# Patient Record
Sex: FEMALE | Race: WHITE | NOT HISPANIC OR LATINO | Employment: STUDENT | URBAN - METROPOLITAN AREA
[De-identification: names, ages, dates, MRNs, and addresses within clinical notes are randomized per-mention and may not be internally consistent; named-entity substitution may affect disease eponyms.]

---
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
## 2025-02-07 ENCOUNTER — OFFICE VISIT (OUTPATIENT)
Dept: FAMILY MEDICINE CLINIC | Facility: CLINIC | Age: 20
End: 2025-02-07
Payer: MEDICARE

## 2025-02-07 VITALS
OXYGEN SATURATION: 97 % | DIASTOLIC BLOOD PRESSURE: 66 MMHG | HEIGHT: 64 IN | HEART RATE: 83 BPM | TEMPERATURE: 97.3 F | BODY MASS INDEX: 39.27 KG/M2 | WEIGHT: 230 LBS | SYSTOLIC BLOOD PRESSURE: 108 MMHG | RESPIRATION RATE: 16 BRPM

## 2025-02-07 DIAGNOSIS — E78.5 HYPERLIPIDEMIA, UNSPECIFIED HYPERLIPIDEMIA TYPE: ICD-10-CM

## 2025-02-07 DIAGNOSIS — R73.9 HYPERGLYCEMIA: ICD-10-CM

## 2025-02-07 DIAGNOSIS — D50.9 IRON DEFICIENCY ANEMIA, UNSPECIFIED IRON DEFICIENCY ANEMIA TYPE: Primary | ICD-10-CM

## 2025-02-07 DIAGNOSIS — Z00.00 HEALTHCARE MAINTENANCE: ICD-10-CM

## 2025-02-07 DIAGNOSIS — Z11.1 SCREENING-PULMONARY TB: ICD-10-CM

## 2025-02-07 PROCEDURE — 99214 OFFICE O/P EST MOD 30 MIN: CPT | Performed by: NURSE PRACTITIONER

## 2025-02-07 PROCEDURE — 99395 PREV VISIT EST AGE 18-39: CPT | Performed by: NURSE PRACTITIONER

## 2025-02-07 NOTE — ASSESSMENT & PLAN NOTE
- No longer taking iron.   - Will obtain updated CBC and iron panel.   Orders:  •  CBC and differential; Future  •  Iron Panel (Includes Ferritin, Iron Sat%, Iron, and TIBC); Future

## 2025-02-07 NOTE — ASSESSMENT & PLAN NOTE
Component      Latest Ref Rng 12/9/2023   Cholesterol      See Comment mg/dL 222 (H)    Triglycerides      See Comment mg/dL 92    HDL      >=50 mg/dL 72    LDL Calculated      0 - 100 mg/dL 132 (H)      - Elevated LDL but HDL is good.   - Encourage healthy diet and regular exercise.  - Will continue to monitor fasting lipid panel.   Orders:  •  Lipid Panel with Direct LDL reflex; Future

## 2025-02-07 NOTE — ASSESSMENT & PLAN NOTE
- Encourage low carb diet and regular exercise.  - Will continue to monitor fasting glucose and hemoglobin A1c.   Orders:  •  Comprehensive metabolic panel; Future  •  Hemoglobin A1C; Future

## 2025-02-07 NOTE — ASSESSMENT & PLAN NOTE
- Reviewed immunizations and screenings.   - Discussed regular dental, vision, and GYN exams.   - Routine labs ordered.   Orders:  •  CBC and differential; Future  •  Comprehensive metabolic panel; Future  •  Lipid Panel with Direct LDL reflex; Future  •  TSH, 3rd generation with Free T4 reflex; Future

## 2025-02-07 NOTE — PROGRESS NOTES
Name: Rosa Maria Alvarado      : 2005      MRN: [id number]  Encounter Provider: SHIN Longoria  Encounter Date: 2025   Encounter department: [facility]    Assessment & Plan  Iron deficiency anemia, unspecified iron deficiency anemia type  - No longer taking iron.   - Will obtain updated CBC and iron panel.   Orders:  •  CBC and differential; Future  •  Iron Panel (Includes Ferritin, Iron Sat%, Iron, and TIBC); Future    Hyperlipidemia, unspecified hyperlipidemia type  Component      Latest Ref Rng 2023   Cholesterol      See Comment mg/dL 222 (H)    Triglycerides      See Comment mg/dL 92    HDL      >=50 mg/dL 72    LDL Calculated      0 - 100 mg/dL 132 (H)      - Elevated LDL but HDL is good.   - Encourage healthy diet and regular exercise.  - Will continue to monitor fasting lipid panel.   Orders:  •  Lipid Panel with Direct LDL reflex; Future    Hyperglycemia  - Encourage low carb diet and regular exercise.  - Will continue to monitor fasting glucose and hemoglobin A1c.   Orders:  •  Comprehensive metabolic panel; Future  •  Hemoglobin A1C; Future    Healthcare maintenance  - Reviewed immunizations and screenings.   - Discussed regular dental, vision, and GYN exams.   - Routine labs ordered.   Orders:  •  CBC and differential; Future  •  Comprehensive metabolic panel; Future  •  Lipid Panel with Direct LDL reflex; Future  •  TSH, 3rd generation with Free T4 reflex; Future    Screening-pulmonary TB    Orders:  •  Quantiferon TB Gold Plus Assay; Future         History of Present Illness     Patient with PMH of HLD, hyperglycemia, and iron deficiency anemia presents to office today for physical. Needs TB screening for her job. She is due for updated blood work. Denies any concerns or complaints today.           Review of Systems   Constitutional:  Negative for fatigue and fever.   HENT:  Negative for congestion, postnasal drip and trouble swallowing.    Eyes:   Negative for visual disturbance.   Respiratory:  Negative for cough and shortness of breath.    Cardiovascular:  Negative for chest pain and palpitations.   Gastrointestinal:  Negative for abdominal pain and blood in stool.   Endocrine: Negative for cold intolerance and heat intolerance.   Genitourinary:  Negative for difficulty urinating, dysuria and frequency.   Musculoskeletal:  Negative for gait problem.   Skin:  Negative for rash.   Neurological:  Negative for dizziness, syncope and headaches.   Hematological:  Negative for adenopathy.   Psychiatric/Behavioral:  Negative for behavioral problems.      History reviewed. No pertinent past medical history.  History reviewed. No pertinent surgical history.  Family History   Adopted: Yes     Social History     Tobacco Use   • Smoking status: Never     Passive exposure: Never   • Smokeless tobacco: Never   Substance and Sexual Activity   • Alcohol use: Not on file   • Drug use: Not on file   • Sexual activity: Not on file     Current Outpatient Medications on File Prior to Visit   Medication Sig   • fluticasone (FLONASE) 50 mcg/act nasal spray 2 sprays into each nostril daily   • naproxen (NAPROSYN) 500 mg tablet Take 500 mg by mouth 2 (two) times a day with meals   • Aviva 3-0.03 MG per tablet Take 1 tablet by mouth daily   • [DISCONTINUED] ferrous sulfate 324 (65 Fe) mg Take 1 tablet (324 mg total) by mouth daily before breakfast     No Known Allergies  Immunization History   Administered Date(s) Administered   • COVID-19 Moderna mRNA Vaccine 12 Yr+ 50 mcg/0.5 mL (Spikevax) 10/07/2023   • DTP 2005, 2005, 02/13/2006, 04/05/2007, 12/17/2010   • DTaP 2005, 2005, 02/13/2006, 04/05/2007   • DTaP / IPV 12/17/2010   • HPV Quadrivalent 09/11/2014, 11/18/2014, 03/20/2015   • Hep A, ped/adol, 2 dose 12/17/2010, 04/10/2012   • Hep B / HiB 2005, 2005, 12/18/2006   • Hep B, Adolescent or Pediatric 2005, 2005, 12/18/2006   •  "Hepatitis A 12/17/2010, 04/10/2012   • HiB 2005, 2005, 12/18/2006   • Hib (PRP-T) 2005, 2005, 12/18/2006   • INFLUENZA 11/16/2007, 12/17/2010, 10/10/2011, 11/18/2014, 11/03/2017, 12/04/2018   • IPV 2005, 2005, 04/05/2007, 12/17/2010   • Influenza, injectable, quadrivalent, preservative free 0.5 mL 12/06/2023   • MMR 12/18/2006, 12/17/2010   • Meningococcal MCV4, Unspecified 11/03/2017, 08/20/2021   • Meningococcal MCV4P 11/03/2017, 08/20/2021   • Pneumococcal Conjugate 13-Valent 2005, 2005, 02/13/2006, 04/05/2007   • Pneumococcal Conjugate PCV 7 2005, 2005, 02/13/2006, 04/05/2007   • Tdap 11/03/2017   • Varicella 08/11/2006, 12/17/2010     Objective   /66 (BP Location: Left arm, Patient Position: Sitting, Cuff Size: Standard)   Pulse 83   Temp (!) 97.3 °F (36.3 °C) (Tympanic)   Resp 16   Ht 5' 4\" (1.626 m)   Wt 104 kg (230 lb)   SpO2 97%   BMI 39.48 kg/m²     Physical Exam  Vitals and nursing note reviewed.   Constitutional:       General: She is not in acute distress.     Appearance: Normal appearance. She is well-developed. She is not ill-appearing.   HENT:      Head: Normocephalic and atraumatic.      Right Ear: Tympanic membrane, ear canal and external ear normal.      Left Ear: Tympanic membrane, ear canal and external ear normal.      Nose: Nose normal.      Mouth/Throat:      Mouth: Mucous membranes are moist.      Pharynx: No posterior oropharyngeal erythema.   Eyes:      Conjunctiva/sclera: Conjunctivae normal.      Pupils: Pupils are equal, round, and reactive to light.   Cardiovascular:      Rate and Rhythm: Normal rate and regular rhythm.      Heart sounds: Normal heart sounds.   Pulmonary:      Effort: Pulmonary effort is normal.      Breath sounds: Normal breath sounds.   Abdominal:      General: Bowel sounds are normal.      Palpations: Abdomen is soft.   Musculoskeletal:         General: Normal range of motion.      Cervical " back: Normal range of motion.   Lymphadenopathy:      Cervical: No cervical adenopathy.   Skin:     General: Skin is warm and dry.   Neurological:      Mental Status: She is alert and oriented to person, place, and time.   Psychiatric:         Mood and Affect: Mood normal.         Behavior: Behavior normal.

## 2025-02-14 ENCOUNTER — APPOINTMENT (OUTPATIENT)
Dept: LAB | Facility: CLINIC | Age: 20
End: 2025-02-14
Payer: MEDICARE

## 2025-02-14 DIAGNOSIS — Z11.1 SCREENING-PULMONARY TB: ICD-10-CM

## 2025-02-14 DIAGNOSIS — Z00.00 HEALTHCARE MAINTENANCE: ICD-10-CM

## 2025-02-14 DIAGNOSIS — R73.9 HYPERGLYCEMIA: ICD-10-CM

## 2025-02-14 DIAGNOSIS — D50.9 IRON DEFICIENCY ANEMIA, UNSPECIFIED IRON DEFICIENCY ANEMIA TYPE: ICD-10-CM

## 2025-02-14 DIAGNOSIS — E78.5 HYPERLIPIDEMIA, UNSPECIFIED HYPERLIPIDEMIA TYPE: ICD-10-CM

## 2025-02-14 LAB
ALBUMIN SERPL BCG-MCNC: 4.5 G/DL (ref 3.5–5)
ALP SERPL-CCNC: 89 U/L (ref 34–104)
ALT SERPL W P-5'-P-CCNC: 22 U/L (ref 7–52)
ANION GAP SERPL CALCULATED.3IONS-SCNC: 10 MMOL/L (ref 4–13)
AST SERPL W P-5'-P-CCNC: 23 U/L (ref 13–39)
BASOPHILS # BLD AUTO: 0.02 THOUSANDS/ÂΜL (ref 0–0.1)
BASOPHILS NFR BLD AUTO: 0 % (ref 0–1)
BILIRUB SERPL-MCNC: 0.54 MG/DL (ref 0.2–1)
BUN SERPL-MCNC: 14 MG/DL (ref 5–25)
CALCIUM SERPL-MCNC: 9.6 MG/DL (ref 8.4–10.2)
CHLORIDE SERPL-SCNC: 102 MMOL/L (ref 96–108)
CHOLEST SERPL-MCNC: 174 MG/DL (ref ?–200)
CO2 SERPL-SCNC: 27 MMOL/L (ref 21–32)
CREAT SERPL-MCNC: 0.55 MG/DL (ref 0.6–1.3)
EOSINOPHIL # BLD AUTO: 0.03 THOUSAND/ÂΜL (ref 0–0.61)
EOSINOPHIL NFR BLD AUTO: 1 % (ref 0–6)
ERYTHROCYTE [DISTWIDTH] IN BLOOD BY AUTOMATED COUNT: 13.1 % (ref 11.6–15.1)
EST. AVERAGE GLUCOSE BLD GHB EST-MCNC: 105 MG/DL
FERRITIN SERPL-MCNC: 9 NG/ML (ref 11–307)
GFR SERPL CREATININE-BSD FRML MDRD: 136 ML/MIN/1.73SQ M
GLUCOSE P FAST SERPL-MCNC: 87 MG/DL (ref 65–99)
HBA1C MFR BLD: 5.3 %
HCT VFR BLD AUTO: 43.1 % (ref 34.8–46.1)
HDLC SERPL-MCNC: 47 MG/DL
HGB BLD-MCNC: 13.6 G/DL (ref 11.5–15.4)
IMM GRANULOCYTES # BLD AUTO: 0.01 THOUSAND/UL (ref 0–0.2)
IMM GRANULOCYTES NFR BLD AUTO: 0 % (ref 0–2)
IRON SATN MFR SERPL: 35 % (ref 15–50)
IRON SERPL-MCNC: 153 UG/DL (ref 50–212)
LDLC SERPL CALC-MCNC: 113 MG/DL (ref 0–100)
LYMPHOCYTES # BLD AUTO: 2.16 THOUSANDS/ÂΜL (ref 0.6–4.47)
LYMPHOCYTES NFR BLD AUTO: 44 % (ref 14–44)
MCH RBC QN AUTO: 25.6 PG (ref 26.8–34.3)
MCHC RBC AUTO-ENTMCNC: 31.6 G/DL (ref 31.4–37.4)
MCV RBC AUTO: 81 FL (ref 82–98)
MONOCYTES # BLD AUTO: 0.34 THOUSAND/ÂΜL (ref 0.17–1.22)
MONOCYTES NFR BLD AUTO: 7 % (ref 4–12)
NEUTROPHILS # BLD AUTO: 2.39 THOUSANDS/ÂΜL (ref 1.85–7.62)
NEUTS SEG NFR BLD AUTO: 48 % (ref 43–75)
NRBC BLD AUTO-RTO: 0 /100 WBCS
PLATELET # BLD AUTO: 279 THOUSANDS/UL (ref 149–390)
PMV BLD AUTO: 9.1 FL (ref 8.9–12.7)
POTASSIUM SERPL-SCNC: 4.1 MMOL/L (ref 3.5–5.3)
PROT SERPL-MCNC: 7.2 G/DL (ref 6.4–8.4)
RBC # BLD AUTO: 5.31 MILLION/UL (ref 3.81–5.12)
SODIUM SERPL-SCNC: 139 MMOL/L (ref 135–147)
TIBC SERPL-MCNC: 436.8 UG/DL (ref 250–450)
TRANSFERRIN SERPL-MCNC: 312 MG/DL (ref 203–362)
TRIGL SERPL-MCNC: 69 MG/DL (ref ?–150)
TSH SERPL DL<=0.05 MIU/L-ACNC: 1.52 UIU/ML (ref 0.45–4.5)
UIBC SERPL-MCNC: 284 UG/DL (ref 155–355)
WBC # BLD AUTO: 4.95 THOUSAND/UL (ref 4.31–10.16)

## 2025-02-14 PROCEDURE — 84443 ASSAY THYROID STIM HORMONE: CPT

## 2025-02-14 PROCEDURE — 82728 ASSAY OF FERRITIN: CPT

## 2025-02-14 PROCEDURE — 85025 COMPLETE CBC W/AUTO DIFF WBC: CPT

## 2025-02-14 PROCEDURE — 36415 COLL VENOUS BLD VENIPUNCTURE: CPT

## 2025-02-14 PROCEDURE — 83550 IRON BINDING TEST: CPT

## 2025-02-14 PROCEDURE — 80061 LIPID PANEL: CPT

## 2025-02-14 PROCEDURE — 83540 ASSAY OF IRON: CPT

## 2025-02-14 PROCEDURE — 80053 COMPREHEN METABOLIC PANEL: CPT

## 2025-02-14 PROCEDURE — 86480 TB TEST CELL IMMUN MEASURE: CPT

## 2025-02-14 PROCEDURE — 83036 HEMOGLOBIN GLYCOSYLATED A1C: CPT

## 2025-02-15 LAB
GAMMA INTERFERON BACKGROUND BLD IA-ACNC: 0.01 IU/ML
M TB IFN-G BLD-IMP: NEGATIVE
M TB IFN-G CD4+ BCKGRND COR BLD-ACNC: 0 IU/ML
M TB IFN-G CD4+ BCKGRND COR BLD-ACNC: 0.01 IU/ML
MITOGEN IGNF BCKGRD COR BLD-ACNC: 9.99 IU/ML

## 2025-02-25 ENCOUNTER — OFFICE VISIT (OUTPATIENT)
Dept: FAMILY MEDICINE CLINIC | Facility: CLINIC | Age: 20
End: 2025-02-25
Payer: MEDICARE

## 2025-02-25 VITALS
OXYGEN SATURATION: 98 % | TEMPERATURE: 96.1 F | SYSTOLIC BLOOD PRESSURE: 118 MMHG | DIASTOLIC BLOOD PRESSURE: 78 MMHG | RESPIRATION RATE: 16 BRPM | BODY MASS INDEX: 39.67 KG/M2 | HEIGHT: 64 IN | WEIGHT: 232.4 LBS | HEART RATE: 88 BPM

## 2025-02-25 DIAGNOSIS — E61.1 IRON DEFICIENCY: Primary | ICD-10-CM

## 2025-02-25 DIAGNOSIS — E78.5 HYPERLIPIDEMIA, UNSPECIFIED HYPERLIPIDEMIA TYPE: ICD-10-CM

## 2025-02-25 PROCEDURE — 99213 OFFICE O/P EST LOW 20 MIN: CPT | Performed by: NURSE PRACTITIONER

## 2025-02-25 RX ORDER — FERROUS SULFATE 324(65)MG
324 TABLET, DELAYED RELEASE (ENTERIC COATED) ORAL
Qty: 90 TABLET | Refills: 0 | Status: SHIPPED | OUTPATIENT
Start: 2025-02-25

## 2025-02-25 NOTE — ASSESSMENT & PLAN NOTE
- Ferritin low but no anemia  - Recommend ferrous sulfate every other day with Vitamin C.   - Will continue to monitor CBC and iron panel.   Orders:  •  ferrous sulfate 324 (65 Fe) mg; Take 1 tablet (324 mg total) by mouth daily before breakfast

## 2025-02-25 NOTE — ASSESSMENT & PLAN NOTE
Component      Latest Ref Rng 12/9/2023 2/14/2025   Cholesterol      See Comment mg/dL 222 (H)  174    Triglycerides      See Comment mg/dL 92  69    HDL      >=50 mg/dL 72  47 (L)    LDL Calculated      0 - 100 mg/dL 132 (H)  113 (H)       - Improved.  - Encourage healthy diet and regular exercise.  - Will continue to monitor fasting lipid panel.

## 2025-02-25 NOTE — PROGRESS NOTES
Name: Rosa Maria Alvarado      : 2005      MRN: [id number]  Encounter Provider: SHIN Longoria  Encounter Date: 2025   Encounter department: [facility] [address] [facility]    Assessment & Plan  Iron deficiency  - Ferritin low but no anemia  - Recommend ferrous sulfate every other day with Vitamin C.   - Will continue to monitor CBC and iron panel.   Orders:  •  ferrous sulfate 324 (65 Fe) mg; Take 1 tablet (324 mg total) by mouth daily before breakfast    Hyperlipidemia, unspecified hyperlipidemia type  Component      Latest Ref Rng 2023   Cholesterol      See Comment mg/dL 222 (H)  174    Triglycerides      See Comment mg/dL 92  69    HDL      >=50 mg/dL 72  47 (L)    LDL Calculated      0 - 100 mg/dL 132 (H)  113 (H)       - Improved.  - Encourage healthy diet and regular exercise.  - Will continue to monitor fasting lipid panel.             History of Present Illness     Patient presents to office today for follow up. Needs work formed signed. She had blood work done recently. Her TB screening was negative. Other labs showed iron deficiency but no anemia. She is no longer taking iron supplement. She denies any concerns or complaints today.      Review of Systems   Constitutional:  Negative for fatigue and fever.   HENT:  Negative for trouble swallowing.    Eyes:  Negative for visual disturbance.   Respiratory:  Negative for cough and shortness of breath.    Cardiovascular:  Negative for chest pain and palpitations.   Gastrointestinal:  Negative for abdominal pain and blood in stool.   Endocrine: Negative for cold intolerance and heat intolerance.   Genitourinary:  Negative for difficulty urinating and dysuria.   Musculoskeletal:  Negative for gait problem.   Skin:  Negative for rash.   Neurological:  Negative for dizziness, syncope and headaches.   Hematological:  Negative for adenopathy.   Psychiatric/Behavioral:  Negative for behavioral problems.      History reviewed. No  pertinent past medical history.  History reviewed. No pertinent surgical history.  Family History   Adopted: Yes     Social History     Tobacco Use   • Smoking status: Never     Passive exposure: Never   • Smokeless tobacco: Never   Vaping Use   • Vaping status: Never Used   Substance and Sexual Activity   • Alcohol use: Never   • Drug use: Never   • Sexual activity: Not on file     Current Outpatient Medications on File Prior to Visit   Medication Sig   • fluticasone (FLONASE) 50 mcg/act nasal spray 2 sprays into each nostril daily   • naproxen (NAPROSYN) 500 mg tablet Take 500 mg by mouth 2 (two) times a day with meals   • Aviva 3-0.03 MG per tablet Take 1 tablet by mouth daily     No Known Allergies  Immunization History   Administered Date(s) Administered   • COVID-19 Moderna mRNA Vaccine 12 Yr+ 50 mcg/0.5 mL (Spikevax) 10/07/2023   • DTP 2005, 2005, 02/13/2006, 04/05/2007, 12/17/2010   • DTaP 2005, 2005, 02/13/2006, 04/05/2007   • DTaP / IPV 12/17/2010   • HPV Quadrivalent 09/11/2014, 11/18/2014, 03/20/2015   • Hep A, ped/adol, 2 dose 12/17/2010, 04/10/2012   • Hep B / HiB 2005, 2005, 12/18/2006   • Hep B, Adolescent or Pediatric 2005, 2005, 12/18/2006   • Hepatitis A 12/17/2010, 04/10/2012   • HiB 2005, 2005, 12/18/2006   • Hib (PRP-T) 2005, 2005, 12/18/2006   • INFLUENZA 11/16/2007, 12/17/2010, 10/10/2011, 11/18/2014, 11/03/2017, 12/04/2018   • IPV 2005, 2005, 04/05/2007, 12/17/2010   • Influenza, injectable, quadrivalent, preservative free 0.5 mL 12/06/2023   • MMR 12/18/2006, 12/17/2010   • Meningococcal MCV4, Unspecified 11/03/2017, 08/20/2021   • Meningococcal MCV4P 11/03/2017, 08/20/2021   • Pneumococcal Conjugate 13-Valent 2005, 2005, 02/13/2006, 04/05/2007   • Pneumococcal Conjugate PCV 7 2005, 2005, 02/13/2006, 04/05/2007   • Tdap 11/03/2017   • Varicella 08/11/2006, 12/17/2010     Objective  "  /78 (BP Location: Left arm, Patient Position: Sitting, Cuff Size: Standard)   Pulse 88   Temp (!) 96.1 °F (35.6 °C) (Tympanic)   Resp 16   Ht 5' 4\" (1.626 m)   Wt 105 kg (232 lb 6.4 oz)   SpO2 98%   BMI 39.89 kg/m²     Physical Exam  Vitals and nursing note reviewed.   Constitutional:       Appearance: Normal appearance. She is well-developed.   HENT:      Head: Normocephalic and atraumatic.      Right Ear: External ear normal.      Left Ear: External ear normal.   Eyes:      Conjunctiva/sclera: Conjunctivae normal.   Cardiovascular:      Rate and Rhythm: Normal rate and regular rhythm.      Heart sounds: Normal heart sounds.   Pulmonary:      Effort: Pulmonary effort is normal.      Breath sounds: Normal breath sounds.   Musculoskeletal:         General: Normal range of motion.      Cervical back: Normal range of motion.   Skin:     General: Skin is warm and dry.   Neurological:      Mental Status: She is alert and oriented to person, place, and time.   Psychiatric:         Mood and Affect: Mood normal.         Behavior: Behavior normal.         "

## 2025-03-09 PROBLEM — Z00.00 HEALTHCARE MAINTENANCE: Status: RESOLVED | Noted: 2025-02-07 | Resolved: 2025-03-09

## 2025-03-09 PROBLEM — Z11.1 SCREENING-PULMONARY TB: Status: RESOLVED | Noted: 2025-02-07 | Resolved: 2025-03-09
